# Patient Record
Sex: FEMALE | Race: WHITE | NOT HISPANIC OR LATINO | URBAN - METROPOLITAN AREA
[De-identification: names, ages, dates, MRNs, and addresses within clinical notes are randomized per-mention and may not be internally consistent; named-entity substitution may affect disease eponyms.]

---

## 2019-02-12 ENCOUNTER — AMBULATORY - RIVER FALLS (OUTPATIENT)
Dept: FAMILY MEDICINE | Facility: CLINIC | Age: 19
End: 2019-02-12

## 2019-02-25 ENCOUNTER — AMBULATORY - RIVER FALLS (OUTPATIENT)
Dept: FAMILY MEDICINE | Facility: CLINIC | Age: 19
End: 2019-02-25

## 2019-02-27 ENCOUNTER — AMBULATORY - RIVER FALLS (OUTPATIENT)
Dept: FAMILY MEDICINE | Facility: CLINIC | Age: 19
End: 2019-02-27

## 2019-03-01 ENCOUNTER — OFFICE VISIT - RIVER FALLS (OUTPATIENT)
Dept: FAMILY MEDICINE | Facility: CLINIC | Age: 19
End: 2019-03-01

## 2019-03-01 ASSESSMENT — MIFFLIN-ST. JEOR: SCORE: 1686.22

## 2019-09-17 ENCOUNTER — AMBULATORY - RIVER FALLS (OUTPATIENT)
Dept: FAMILY MEDICINE | Facility: CLINIC | Age: 19
End: 2019-09-17

## 2019-09-19 ENCOUNTER — AMBULATORY - RIVER FALLS (OUTPATIENT)
Dept: FAMILY MEDICINE | Facility: CLINIC | Age: 19
End: 2019-09-19

## 2019-09-30 ENCOUNTER — AMBULATORY - RIVER FALLS (OUTPATIENT)
Dept: FAMILY MEDICINE | Facility: CLINIC | Age: 19
End: 2019-09-30

## 2019-10-02 ENCOUNTER — AMBULATORY - RIVER FALLS (OUTPATIENT)
Dept: FAMILY MEDICINE | Facility: CLINIC | Age: 19
End: 2019-10-02

## 2022-02-12 VITALS
WEIGHT: 204 LBS | DIASTOLIC BLOOD PRESSURE: 68 MMHG | SYSTOLIC BLOOD PRESSURE: 122 MMHG | BODY MASS INDEX: 33.99 KG/M2 | HEART RATE: 76 BPM | HEIGHT: 65 IN | TEMPERATURE: 98.2 F

## 2022-02-15 NOTE — NURSING NOTE
PPD Reading POC Entered On:  10/2/2019 6:45 PM CDT    Performed On:  10/2/2019 6:40 PM CDT by Aracely Daniels               PPD Reading   PPD mm of Induration :   0 mm   PPD Interpretation :   Negative   PPD Completion Status :   Completed   Aracely Daniels - 10/2/2019 6:42 PM CDT

## 2022-02-15 NOTE — NURSING NOTE
PPD Reading POC Entered On:  2/27/2019 1:24 PM CST    Performed On:  2/14/2019 1:24 PM CST by Dawn Feliciano RN               PPD Reading   PPD mm of Induration :   0 mm   PPD Interpretation :   Negative   PPD Completion Status :   Completed   Dawn Feliciano RN - 2/27/2019 1:24 PM CST

## 2022-02-15 NOTE — NURSING NOTE
PPD Reading POC Entered On:  2/27/2019 12:58 PM CST    Performed On:  2/27/2019 12:57 PM CST by Theresa Euceda RN               PPD Reading   PPD mm of Induration :   0 mm   PPD Interpretation :   Negative   PPD Completion Status :   Completed   PPD Result Comment :   Patient given [facility] form   Theresa Euceda RN - 2/27/2019 12:57 PM CST

## 2022-02-15 NOTE — NURSING NOTE
PPD Administration POC Entered On:  2/25/2019 12:47 PM CST    Performed On:  2/25/2019 12:46 PM CST by Theresa Euceda RN               PPD Administration   PPD Insertion Site :   Left forearm   POC Test Comments :   Wheal created   PPD Amount Administered (mL) :   0.1 mL   Theresa Euceda RN - 2/25/2019 12:46 PM CST   Details   Collection Date :   2/25/2019 12:45 PM CST   Expiration Date :   4/5/2021 CDT   Lot#/Manufacture :   [id number]    :   [employer]   POC Test Comments :   Step 2   Theresa Euceda RN - 2/25/2019 12:46 PM CST

## 2022-02-15 NOTE — NURSING NOTE
PPD Administration POC Entered On:  2/12/2019 4:41 PM CST    Performed On:  2/12/2019 4:41 PM CST by Theresa Euceda RN               PPD Administration   PPD Insertion Site :   Right forearm   POC Test Comments :   Wheal created, Step 1   PPD Amount Administered (mL) :   0.1 mL   Theresa Euceda RN 2/12/2019 4:41 PM CST   Details   Collection Date :   2/12/2019 4:36 PM CST   Expiration Date :   3/14/2021 CST   Lot#/Manufacture :   [id number]    :   [employer] Sprague RN, Tara D - 2/12/2019 4:41 PM CST

## 2022-02-15 NOTE — NURSING NOTE
Comprehensive Intake Entered On:  3/1/2019 3:41 PM CST    Performed On:  3/1/2019 3:37 PM CST by Julia Larose LPN               Summary   Chief Complaint :   right eye red and itchy, bilateraly ear pain.   Weight Measured :   204 lb(Converted to: 204 lb 0 oz, 92.53 kg)    Height Measured :   65 in(Converted to: 5 ft 5 in, 165.10 cm)    Body Mass Index :   33.94 kg/m2   Body Surface Area :   2.06 m2   Systolic Blood Pressure :   122 mmHg   Diastolic Blood Pressure :   68 mmHg   Mean Arterial Pressure :   86 mmHg   Peripheral Pulse Rate :   76 bpm   BP Site :   Right arm   Pulse Site :   Radial artery   BP Method :   Manual   HR Method :   Manual   Temperature Tympanic :   98.2 DegF(Converted to: 36.8 DegC)    Julia Larose LPN - 3/1/2019 3:37 PM CST   Health Status   Allergies Verified? :   Yes   Medication History Verified? :   Yes   Tobacco Use? :   Never smoker   Julia Larose LPN - 3/1/2019 3:37 PM CST   Consents   Consent for Immunization Exchange :   Consent Granted   Consent for Immunizations to Providers :   Consent Granted   Julia Larose LPN - 3/1/2019 3:37 PM CST   Meds / Allergies   (As Of: 3/1/2019 3:41:32 PM CST)   Allergies (Active)   No Known Medication Allergies  Estimated Onset Date:   Unspecified ; Created By:   Julia Larose LPN; Reaction Status:   Active ; Category:   Drug ; Substance:   No Known Medication Allergies ; Type:   Allergy ; Updated By:   Julia Larose LPN; Reviewed Date:   3/1/2019 3:40 PM CST        Medication List   (As Of: 3/1/2019 3:41:32 PM CST)   No Known Home Medications     Julia Larose LPN - 3/1/2019 3:40:11 PM

## 2022-02-15 NOTE — NURSING NOTE
PPD Administration POC Entered On:  9/30/2019 3:08 PM CDT    Performed On:  9/30/2019 3:08 PM CDT by Theresa Euceda RN               PPD Administration   PPD Insertion Site :   Right forearm   POC Test Comments :   Wheal created   PPD Amount Administered (mL) :   0.1 mL   Theresa Euceda RN - 9/30/2019 3:08 PM CDT   Details   Collection Date :   9/30/2019 3:05 PM CDT   Expiration Date :   6/17/2021 CDT   Lot#/Manufacture :   [id number]    :   [employer]ur   POC Test Comments :   Step 2   Theresa Euceda RN - 9/30/2019 3:08 PM CDT

## 2022-02-15 NOTE — TELEPHONE ENCOUNTER
"---------------------  From: Suri Joe CMA   Sent: 3/1/2019 3:18:13 PM CST  Subject: General Message-ear pain      Phone Message    PCP:   _      Time of Call:  1449       Person Calling:  self  Phone number:  [phone]    Returned call at: [phone]    Note:   pt c/o BL ear pain and ? pinkeye, ? if should be seen    Called pt back states having ear pains x 3 days, pain worse last night and more tolerable today.  Eyes \"pink\" and very dry.  Very little drainage.  Recommended either monitoring sx and treating ear discomfort with Tylenol/ibuprofen or offered appt, also discussed UC hrs.  She would like to be seen today and transferred to [facility].  "

## 2022-02-15 NOTE — PROGRESS NOTES
Patient:   MONICA MUNOZ            MRN: [id number]            FIN: [id number]               Age:   18 years     Sex:  Female     :  2000   Associated Diagnoses:   Conjunctivitis; Head cold   Author:   Brock Akins PA-C      Chief Complaint   3/1/2019 3:37 PM CST     right eye red and itchy, bilateraly ear pain.      History of Present Illness   Chief complaint and symptoms noted above and confirmed with patient    3 day hx of bilateral ear pain, some headache  this morning woke up with red irritated LEFT eye, some drainage  does not wear contacts  no treatments         Review of Systems   Constitutional:  Negative.    Eye:       Discharge: Right.    Ear/Nose/Mouth/Throat:  Sore throat, No nasal congestion.         Ear pain: Bilateral.    Respiratory:  Cough.       Health Status   Allergies:    Allergic Reactions (Selected)  No Known Medication Allergies   Medications: not on any regular medications   Problem list:    All Problems  Obesity / SNOMED CT [id number] / Probable      Histories   Past Medical History:    No active or resolved past medical history items have been selected or recorded.   Family History:    No family history items have been selected or recorded.   Procedure history:    No active procedure history items have been selected or recorded.      Physical Examination   Vital Signs   3/1/2019 3:37 PM CST Temperature Tympanic 98.2 DegF    Peripheral Pulse Rate 76 bpm    Pulse Site Radial artery    HR Method Manual    Systolic Blood Pressure 122 mmHg    Diastolic Blood Pressure 68 mmHg    Mean Arterial Pressure 86 mmHg    BP Site Right arm    BP Method Manual      Measurements from flowsheet : Measurements   3/1/2019 3:37 PM CST Height Measured - Standard 65 in    Weight Measured - Standard 204 lb    BSA 2.06 m2    Body Mass Index 33.94 kg/m2    Body Mass Index Percentile 96.80      General:  No acute distress.    Eye:  Pupils are equal, round and reactive to light, Extraocular movements  are intact, left sclera and conjunctiva are moderately injected, no mattering.  Right eye is normal, without injection.    HENT:  Tympanic membranes are clear, No pharyngeal erythema, No sinus tenderness, nares are patent.    Neck:  Supple, Non-tender, No lymphadenopathy.    Respiratory:  Lungs are clear to auscultation.       Impression and Plan   Diagnosis     Conjunctivitis (GPU93-JN H10.022).     Head cold (PVW84-RO J00).     Summary:  probable conjunctivitis in left eye along with head congestion causing some pressure in ears  will treat with tobramycin eye drops, advised using expectorant/decongestants (like dayquil)  good handwashing  follow up if not improving.    Orders     Orders   Pharmacy:  tobramycin 0.3% ophthalmic solution (Prescribe): 1 drop(s), Eye-Left, QID, x 7 day(s), # 5 mL, 0 Refill(s), Type: Maintenance, Pharmacy: [facility], 1 drop(s) Eye-Left qid,x7 day(s).     Orders   Charges (Evaluation and Management):  86203 office outpatient visit 15 minutes (Charge) (Order): Quantity: 1, Conjunctivitis  Head cold.

## 2022-02-15 NOTE — NURSING NOTE
PPD Reading POC Entered On:  9/19/2019 3:03 PM CDT    Performed On:  9/19/2019 3:03 PM CDT by Theresa Euceda RN               PPD Reading   PPD mm of Induration :   0 mm   PPD Interpretation :   Negative   PPD Completion Status :   Completed   PPD Result Comment :   Patient given copy of [place] form   Theresa Euceda RN - 9/19/2019 3:03 PM CDT

## 2022-02-15 NOTE — NURSING NOTE
PPD Administration POC Entered On:  9/17/2019 3:03 PM CDT    Performed On:  9/17/2019 3:02 PM CDT by Theresa Euceda RN               PPD Administration   PPD Insertion Site :   Right forearm   POC Test Comments :   Wheal created   PPD Amount Administered (mL) :   0.1 mL   Theresa Euceda RN - 9/17/2019 3:02 PM CDT   Details   Collection Date :   9/17/2019 3:01 PM CDT   Expiration Date :   6/17/2021 CDT   Lot#/Manufacture :   [id number]    :   [employer] Sprague RN, Tara D - 9/17/2019 3:02 PM CDT